# Patient Record
Sex: FEMALE | Race: BLACK OR AFRICAN AMERICAN | URBAN - METROPOLITAN AREA
[De-identification: names, ages, dates, MRNs, and addresses within clinical notes are randomized per-mention and may not be internally consistent; named-entity substitution may affect disease eponyms.]

---

## 2019-06-13 ENCOUNTER — OFFICE VISIT (OUTPATIENT)
Dept: NEUROLOGY | Age: 49
End: 2019-06-13

## 2019-06-13 VITALS
HEIGHT: 68 IN | BODY MASS INDEX: 26.83 KG/M2 | WEIGHT: 177 LBS | HEART RATE: 64 BPM | SYSTOLIC BLOOD PRESSURE: 124 MMHG | DIASTOLIC BLOOD PRESSURE: 80 MMHG | OXYGEN SATURATION: 99 %

## 2019-06-13 DIAGNOSIS — G43.019 INTRACTABLE MIGRAINE WITHOUT AURA AND WITHOUT STATUS MIGRAINOSUS: Primary | ICD-10-CM

## 2019-06-13 RX ORDER — ERGOCALCIFEROL 1.25 MG/1
50000 CAPSULE ORAL
COMMUNITY

## 2019-06-13 RX ORDER — RIZATRIPTAN BENZOATE 10 MG/1
10 TABLET ORAL
COMMUNITY

## 2019-06-13 RX ORDER — GABAPENTIN 300 MG/1
300 CAPSULE ORAL 3 TIMES DAILY
COMMUNITY

## 2019-06-13 RX ORDER — ALBUTEROL SULFATE 90 UG/1
AEROSOL, METERED RESPIRATORY (INHALATION)
COMMUNITY

## 2019-06-13 RX ORDER — RIBOFLAVIN (VITAMIN B2) 100 MG
TABLET ORAL DAILY
COMMUNITY

## 2019-06-13 RX ORDER — BUTALBITAL, ACETAMINOPHEN AND CAFFEINE 50; 325; 40 MG/1; MG/1; MG/1
1 TABLET ORAL
Qty: 40 TAB | Refills: 5 | Status: SHIPPED | OUTPATIENT
Start: 2019-06-13

## 2019-06-13 RX ORDER — FLUTICASONE PROPIONATE 50 MCG
2 SPRAY, SUSPENSION (ML) NASAL DAILY
COMMUNITY

## 2019-06-13 NOTE — PROGRESS NOTES
HISTORY OF PRESENT ILLNESS  Melani Diggs is a 57 y.o. female. This patient is a 43-year-old left-handed -American female here for headaches. She has about 10 headache days a month. They are usually unilateral but can be either side, they are associated with photophobia and nausea, very rarely vomiting. She has had them for over 30 years however she thinks they are worse now than they were 10 years ago. She is a  and gets her medical care primarily at the [place]. She filled prescriptions there. She does not have a family history of migraine. She is on gabapentin 1200 mg at night and 300 mg twice daily for migraine. She has taken propranolol in the past, topiramate in the past and several other drugs that she does not remember the name of as prophylactic drugs that have been effective. She has gained weight since she was started on gabapentin. She has children with migraine she does not have any siblings or parents with the best of her knowledge. She does have a history of asthma for 15 years. She takes Flonase and albuterol and Symbicort. Review of Systems   Constitutional:        Review of systems is positive for anxiety, chest pain, constipation, cough, depression, fainting, fatigue, frequent headaches, hearing loss, hepatitis history, joint pain, leg swelling, memory loss, muscle pain, poor appetite, rash, ringing in ears, shortness of breath, stomach pain, vertigo, visual disturbance, weight gain. Complete review of systems done all others negative what she describes his vertigo is rather unsteadiness. Current Outpatient Medications on File Prior to Visit   Medication Sig Dispense Refill    rizatriptan (MAXALT) 10 mg tablet Take 10 mg by mouth once as needed for Migraine. May repeat in 2 hours if needed      gabapentin (NEURONTIN) 300 mg capsule Take 300 mg by mouth three (3) times daily.  1 tab in the morning   1tab at mid day  4 tabs at night      fluticasone propionate [facility] ALLERGY RELIEF) 50 mcg/actuation nasal spray 2 Sprays by Both Nostrils route daily.  riboflavin, vitamin B2, (VITAMIN B-2) 100 mg tablet Take  by mouth daily.  ergocalciferol (VITAMIN D2) 50,000 unit capsule Take 50,000 Units by mouth every seven (7) days.  albuterol (PROVENTIL HFA, VENTOLIN HFA, PROAIR HFA) 90 mcg/actuation inhaler Take  by inhalation.  acetaminophen/diphenhydramine (TYLENOL PM PO) Take  by mouth.  Aspirin-Caffeine (BC) 845-65 mg pwpk Take  by mouth. No current facility-administered medications on file prior to visit. Past Medical History:   Diagnosis Date    Asthma     Headache      No family history on file. Social History     Tobacco Use    Smoking status: Never Smoker    Smokeless tobacco: Never Used   Substance Use Topics    Alcohol use: Never     Frequency: Never    Drug use: Never     /80   Pulse 64   Ht 5' 8\" (1.727 m)   Wt 177 lb (80.3 kg)   SpO2 99%   BMI 26.91 kg/m²       Physical Exam  Constitutional: Oriented to person, place, and time, appears well-developed and well-nourished. No distress. HENT:   Head: Normocephalic and atraumatic. Mouth/Throat: Oropharynx is clear and moist. No oropharyngeal exudate. Eyes: Conjunctivae and EOM are normal. Pupils are equal, round, and reactive to light. No scleral icterus. Neck: Normal range of motion. Neck supple. No thyromegaly present. Cardiovascular: Normal rate, regular rhythm and normal heart sounds. Musculoskeletal: Normal range of motion, exhibits no edema, tenderness or deformity. Lymphadenopathy: no cervical adenopathy. Neurological: Alert and oriented to person, place, and time. Normal strength and normal reflexes. Displays no atrophy and no tremor. No cranial nerve deficit or sensory deficit. Exhibits normal muscle tone. Displays a negative Romberg sign, no seizure activity. Coordination normal, gait normal.   No Babinski's sign on the right side. No Babinski's sign on the left side. Speech, language and mentation are normal  Visual fields are full to confrontation, funduscopic exam reveals flat discs, the retina and vasculature are normal   Skin: Skin is warm and dry. No rash noted, not diaphoretic. No erythema. Psychiatric: Normal mood and affect,  behavior is normal. Judgment and thought content normal.   Vitals reviewed. ASSESSMENT and PLAN  COMMON MIGRAINE  This patient has quite a few headache days per month, about 1 and 3. I will go ahead and have her continue her rizatriptan at the [place] is given her, I have given her prescription for Fioricet that she can take with it. She needs to be on effective prophylactic medication. She has failed propranolol and topiramate in the past.  She is on gabapentin high dose and is gaining weight on I am sure. I am going to recommend she take Emgality 120 mg subq monthly. I have given her instructions on tapering off of the gabapentin. I doubt she will lose weight off the gabapentin but we should stop gaining weight. She has to fill all these medications through the [place] so I do not know what the preauthorization process is going to involve. I have asked the patient to take the prescription to the pharmacy at the [place] and then call us and let us know where things stand. We have a preauthorization person who can handle this however we usually deal with commercial insurance and not the [place]. I will see her back and 6 months. I have asked her to call us in 30 days and let us know how her headaches are doing    This note will not be viewable in [employer].

## 2019-06-13 NOTE — PATIENT INSTRUCTIONS
Call us 1 month after you start your injections and et us know how your hasdaches are doing Office Policies 
o Phone calls/patient messages: Please allow up to 24 hours for someone in the office to contact you about your call or message. Be mindful your provider may be out of the office or your message may require further review. We encourage you to use [employer] for your messages as this is a faster, more efficient way to communicate with our officeo Medication Refills: 
Prescription medications require up to 48 business hours to process. We encourage you to use [employer] for your refills. For controlled medications: Please allow up to 72 business hours to process. Certain medications may require you to  a written prescription at our office. NO narcotic/controlled medications will be prescribed after 4pm Monday through Friday or on weekendso Form/Paperwork Completion: We ask that you allow 7-14 business days. You may also download your forms to [employer] to have your doctor print off. A Healthy Lifestyle: Care Instructions Your Care Instructions A healthy lifestyle can help you feel good, stay at a healthy weight, and have plenty of energy for both work and play. A healthy lifestyle is something you can share with your whole family. A healthy lifestyle also can lower your risk for serious health problems, such as high blood pressure, heart disease, and diabetes. You can follow a few steps listed below to improve your health and the health of your family. Follow-up care is a key part of your treatment and safety. Be sure to make and go to all appointments, and call your doctor if you are having problems. It's also a good idea to know your test results and keep a list of the medicines you take. How can you care for yourself at home? · Do not eat too much sugar, fat, or fast foods. You can still have dessert and treats now and then. The goal is moderation. · Start small to improve your eating habits. Pay attention to portion sizes, drink less juice and soda pop, and eat more fruits and vegetables. ? Eat a healthy amount of food. A 3-ounce serving of meat, for example, is about the size of a deck of cards. Fill the rest of your plate with vegetables and whole grains. ? Limit the amount of soda and sports drinks you have every day. Drink more water when you are thirsty. ? Eat at least 5 servings of fruits and vegetables every day. It may seem like a lot, but it is not hard to reach this goal. A serving or helping is 1 piece of fruit, 1 cup of vegetables, or 2 cups of leafy, raw vegetables. Have an apple or some carrot sticks as an afternoon snack instead of a candy bar. Try to have fruits and/or vegetables at every meal. 
· Make exercise part of your daily routine. You may want to start with simple activities, such as walking, bicycling, or slow swimming. Try to be active 30 to 60 minutes every day. You do not need to do all 30 to 60 minutes all at once. For example, you can exercise 3 times a day for 10 or 20 minutes. Moderate exercise is safe for most people, but it is always a good idea to talk to your doctor before starting an exercise program. 
· Keep moving. Fermin Joey the lawn, work in the garden, or [address]. Take the stairs instead of the elevator at work. · If you smoke, quit. People who smoke have an increased risk for heart attack, stroke, cancer, and other lung illnesses. Quitting is hard, but there are ways to boost your chance of quitting tobacco for good. ? Use nicotine gum, patches, or lozenges. ? Ask your doctor about stop-smoking programs and medicines. ? Keep trying. In addition to reducing your risk of diseases in the future, you will notice some benefits soon after you stop using tobacco. If you have shortness of breath or asthma symptoms, they will likely get better within a few weeks after you quit. · Limit how much alcohol you drink. Moderate amounts of alcohol (up to 2 drinks a day for men, 1 drink a day for women) are okay. But drinking too much can lead to liver problems, high blood pressure, and other health problems. Family health If you have a family, there are many things you can do together to improve your health. · Eat meals together as a family as often as possible. · Eat healthy foods. This includes fruits, vegetables, lean meats and dairy, and whole grains. · Include your family in your fitness plan. Most people think of activities such as jogging or tennis as the way to fitness, but there are many ways you and your family can be more active. Anything that makes you breathe hard and gets your heart pumping is exercise. Here are some tips: 
? Walk to do errands or to take your child to school or the bus. 
? Go for a family bike ride after dinner instead of watching TV. Where can you learn more? Go to http://sandra-mickey.info/. Enter K339 in the search box to learn more about \"A Healthy Lifestyle: Care Instructions. \" Current as of: September 11, 2018 Content Version: 11.9 © 6018-9549 [employer]. Care instructions adapted under license by [employer] (which disclaims liability or warranty for this information). If you have questions about a medical condition or this instruction, always ask your healthcare professional. [address] any warranty or liability for your use of this information.